# Patient Record
(demographics unavailable — no encounter records)

---

## 2025-04-10 NOTE — PHYSICAL EXAM
[Varicose Veins Of Lower Extremities] : bilaterally [Ankle Swelling On The Right] : mild [2+] : left foot dorsalis pedis 2+ [FreeTextEntry3] : Temperature gradient within normal limits. Toes are warm to touch. CFT: 3 seconds x10  Minimal hair growth at digits, decreased but minimal at the digits. Negative brawniness of skin. Negative open wounds bilateral lower extremity.  [de-identified] : Mild HAV deformity bilaterally, right greater than left. There is hammertoe deformity of the 5th, left foot. Muscle strength is 5/5. Remainder of pedal joints are within normal limits. [FreeTextEntry4] : absent vibratory 1st MPJ, medial malleoli and distal tibial tuberosities [FreeTextEntry8] : absent vibratory 1st MPJ, medial malleoli and distal tibial tuberosities [FreeTextEntry1] : [place]-Shy monofilament testing absent at all digits bilaterally. Diminished but present at the midfoot and plantar aspect of the calcaneus, bilateral.

## 2025-04-10 NOTE — PHYSICAL EXAM
[Varicose Veins Of Lower Extremities] : bilaterally [Ankle Swelling On The Right] : mild [2+] : left foot dorsalis pedis 2+ [FreeTextEntry3] : Temperature gradient within normal limits. Toes are warm to touch. CFT: 3 seconds x10  Minimal hair growth at digits, decreased but minimal at the digits. Negative brawniness of skin. Negative open wounds bilateral lower extremity.  [de-identified] : Mild HAV deformity bilaterally, right greater than left. There is hammertoe deformity of the 5th, left foot. Muscle strength is 5/5. Remainder of pedal joints are within normal limits. [FreeTextEntry4] : absent vibratory 1st MPJ, medial malleoli and distal tibial tuberosities [FreeTextEntry8] : absent vibratory 1st MPJ, medial malleoli and distal tibial tuberosities [FreeTextEntry1] : [place]-Shy monofilament testing absent at all digits bilaterally. Diminished but present at the midfoot and plantar aspect of the calcaneus, bilateral.

## 2025-04-10 NOTE — PHYSICAL EXAM
[Varicose Veins Of Lower Extremities] : bilaterally [Ankle Swelling On The Right] : mild [2+] : left foot dorsalis pedis 2+ [FreeTextEntry3] : Temperature gradient within normal limits. Toes are warm to touch. CFT: 3 seconds x10  Minimal hair growth at digits, decreased but minimal at the digits. Negative brawniness of skin. Negative open wounds bilateral lower extremity.  [de-identified] : Mild HAV deformity bilaterally, right greater than left. There is hammertoe deformity of the 5th, left foot. Muscle strength is 5/5. Remainder of pedal joints are within normal limits. [FreeTextEntry4] : absent vibratory 1st MPJ, medial malleoli and distal tibial tuberosities [FreeTextEntry8] : absent vibratory 1st MPJ, medial malleoli and distal tibial tuberosities [FreeTextEntry1] : [place]-Shy monofilament testing absent at all digits bilaterally. Diminished but present at the midfoot and plantar aspect of the calcaneus, bilateral.

## 2025-04-10 NOTE — HISTORY OF PRESENT ILLNESS
[FreeTextEntry1] : Patient presents today as a new patient for evaluation. His daughter is present for today's visit. He denies any intermittent type symptoms. He denies any tingling or numbness in his feet. His last hemoglobin A1c was around 7. He last saw his medical doctor approximately 2 2 1/2 months ago and has a follow-up appointment in 2 months. the patient stats he has not seen a podiatrist in the past and this is his first visit. He has been diabetic for several years. He presents today wearing Skecher type sneakers. They are appropriate size and fit for the patient. He does walk barefoot at home but sometimes with socks.

## 2025-04-10 NOTE — ASSESSMENT
[FreeTextEntry1] : Impression:  Diabetic with neuropathy. Onychomycosis. Onychodystrophy. Xerosis.  Treatment: Discussed findings and conditions with the patient along with the patient's family. They are to examine and visually inspect his feet daily. He cannot rely solely on feel especially at the digits where there is absent protective sensation. He is to avoid walking barefoot. I recommended closed slippers for the patient, which are rounder in the toe box to accommodate his hammertoe deformity and accommodate his foot type. They are to moisturize his feet daily. Avoid moisturizers interdigitally to help prevent any breaks in skin and further risk of infection. The hallux and 2nd toe mycotic nails on the left were prepped and manually debrided with sterile nippers and decreased in height and smoothed with a rotary sanna. Subungual debris was gently curettaged without incidence. Remainder of dystrophic nails x8 were prepped and trimmed with a sterile nipper and smoothed with a rotary sanna to prevent any risk of infection. The hyperkeratotic lesion was prepped of the 5th digit left foot and trimmed with a #15 blade again without incidence. Patient is to return in 3 months. If there is any pain, problems or concern, he is to contact the office immediately.